# Patient Record
Sex: FEMALE | Race: OTHER | Employment: UNEMPLOYED | URBAN - METROPOLITAN AREA
[De-identification: names, ages, dates, MRNs, and addresses within clinical notes are randomized per-mention and may not be internally consistent; named-entity substitution may affect disease eponyms.]

---

## 2019-03-18 ENCOUNTER — HOSPITAL ENCOUNTER (OUTPATIENT)
Age: 24
Discharge: HOME | End: 2019-03-18
Attending: OBSTETRICS & GYNECOLOGY | Admitting: OBSTETRICS & GYNECOLOGY
Payer: SELF-PAY

## 2019-03-18 VITALS
RESPIRATION RATE: 16 BRPM | OXYGEN SATURATION: 98 % | BODY MASS INDEX: 23.04 KG/M2 | HEIGHT: 63 IN | DIASTOLIC BLOOD PRESSURE: 73 MMHG | WEIGHT: 130 LBS | SYSTOLIC BLOOD PRESSURE: 110 MMHG | TEMPERATURE: 98.3 F | HEART RATE: 96 BPM

## 2019-03-18 PROBLEM — O09.33 NO PRENATAL CARE IN CURRENT PREGNANCY IN THIRD TRIMESTER: Status: ACTIVE | Noted: 2019-03-18

## 2019-03-18 PROBLEM — R10.2 PELVIC PAIN IN ANTEPARTUM PERIOD IN THIRD TRIMESTER: Status: ACTIVE | Noted: 2019-03-18

## 2019-03-18 PROBLEM — O26.893 PELVIC PAIN IN ANTEPARTUM PERIOD IN THIRD TRIMESTER: Status: ACTIVE | Noted: 2019-03-18

## 2019-03-18 PROBLEM — O34.219 PREVIOUS CESAREAN DELIVERY AFFECTING PREGNANCY, ANTEPARTUM: Status: ACTIVE | Noted: 2019-03-18

## 2019-03-18 PROBLEM — O46.90 VAGINAL BLEEDING DURING PREGNANCY, ANTEPARTUM: Status: ACTIVE | Noted: 2019-03-18

## 2019-03-18 PROCEDURE — 75810000275 HC EMERGENCY DEPT VISIT NO LEVEL OF CARE: Performed by: OBSTETRICS & GYNECOLOGY

## 2019-03-18 PROCEDURE — 76815 OB US LIMITED FETUS(S): CPT

## 2019-03-18 PROCEDURE — 87186 SC STD MICRODIL/AGAR DIL: CPT

## 2019-03-18 PROCEDURE — 87088 URINE BACTERIA CULTURE: CPT

## 2019-03-18 PROCEDURE — 87086 URINE CULTURE/COLONY COUNT: CPT

## 2019-03-18 PROCEDURE — 99285 EMERGENCY DEPT VISIT HI MDM: CPT

## 2019-03-18 PROCEDURE — 59025 FETAL NON-STRESS TEST: CPT

## 2019-03-18 RX ORDER — CEPHALEXIN 500 MG/1
500 CAPSULE ORAL 3 TIMES DAILY
Qty: 21 CAP | Refills: 0 | OUTPATIENT
Start: 2019-03-18 | End: 2019-03-25

## 2019-03-18 NOTE — PROGRESS NOTES
Pt to [facility] with c/o contractions. . No PNC with this pregnancy. Last baby was born in [place] who is 1years old via primary C/S. No problems with previous pregnancy. Pt wishes for a  and non pharmacological with this pregnancy. Thinks she is about 42 weeks pregnant according to LMP of May 19, 2018. Dr. Robert Carpenter at bedside.

## 2019-03-18 NOTE — DISCHARGE INSTRUCTIONS
Patient Education        Precauciones en Cathy Loveless: Instrucciones de cuidado - [ Pregnancy Precautions: Care Instructions ]  Instrucciones de cuidado    No hay nayan manera price de prevenir el trabajo de parto antes de la fecha esperada (trabajo de parto prematuro) o de prevenir la mayoría de otros problemas en el [facility]. Sylvester hay cosas que puede hacer para aumentar las probabilidades de tener un embarazo saludable. Vaya a kaushik citas, siga los consejos de colon médico y cuídese. Coma abelardo y luis ejercicio (si colon médico lo permite). Y asegúrese de charmaine abundante agua. La atención de seguimiento es anyan parte clave de colon tratamiento y seguridad. Asegúrese de hacer y acudir a todas las citas, y llame a colon médico si está teniendo problemas. También es nayan buena idea saber los resultados de kaushik exámenes y mantener nayan lista de los medicamentos que courtney. ¿Cómo puede cuidarse en el hogar? · Asegúrese de asistir a las citas prenatales. Colon médico le tomará la presión arterial en cada consulta. Colon médico también comprobará si tiene proteínas en colon orina. Tanto la presión arterial narda devin la presencia de proteínas en la orina son señales de preeclampsia. Esta afección puede ser peligrosa tanto para usted devin para colon bebé. · Kate abundantes líquidos, suficientes para que colon orina sea de color amarillo horace o transparente devin el agua. La deshidratación puede causar contracciones. Si tiene [facility] [employer], el corazón o el hígado y tiene que [employer] líquidos, hable con colon médico antes de aumentar colon consumo. · Notifique a colon médico de inmediato si presenta cualquier síntoma de infección, tales devin:  ? Ardor cuando orina. ? Flujo con mal olor de la vagina. ? Comezón en la vagina. ? Theone Mungo sin explicación. ? Dolor o sensibilidad inusual en el útero o la parte baja del abdomen. · Aliméntese en forma equilibrada. Incluya muchos alimentos que ayaz ricos en calcio y adrián. ?  Entre los alimentos ricos en calcio se incluyen la [place], el [place]-barre, el yogur, Rufina Mandril y el brócoli. ? Entre los alimentos ricos en adrián se incluyen las tosha bush, los [place], las aves, los [place], los frijoles, las uvas pasas, el pan de grano integral y las verduras de hojas verdes. · No fume. Si necesita ayuda para dejar de fumar, hable con colon médico sobre programas y medicamentos para dejar de fumar. Estos pueden aumentar kaushik probabilidades de dejar el hábito para siempre. · No kaushal alcohol ni use drogas ilegales. · Siga las instrucciones de colon médico acerca de la [place]. Colon médico le dirá cuánto ejercicio puede hacer. · Pregúntele a colon médico si puede tener [employer]. Si usted está en riesgo de tener trabajo de [place], colon médico podría pedirle que no tenga relaciones sexuales. · [place] precauciones para prevenir las caídas. Kelli el embarazo las articulaciones están más sueltas y se tiene menos equilibrio. Los deportes tales devin el ciclismo, el esquí o el patinaje en línea pueden aumentar el riesgo de caídas. Y no monte a jeannie, melissa en motocicleta, luis clavados, luis esquí acuático, bucee, ni salte en paracaídas mientras está embarazada. · Evite calentarse demasiado. No use saunas ni bañeras de hidromasaje. Evite la exposición al sol en climas calientes por mucho tiempo. [place] acetaminofén (Tylenol) para bajar nayan fiebre narda. · No tome medicamentos de venta mary, productos herbarios ni suplementos sin hablar araseli con colon médico o farmacéutico.  ¿Cuándo debe pedir ayuda? Llame al 911 en cualquier momento que considere que necesita atención de [place]. Por ejemplo, llame si:    · Se desmayó (perdió el conocimiento).     · Tiene sangrado vaginal intenso.     · Tiene dolor intenso en el vientre o la pelvis.     · Le sale abundante líquido o gotea de la vagina y sabe o jessica que el cordón umbilical se está saliendo a colon vagina.  Si esto sucede, arrodíllese de inmediato, de edith forma que kaushik nalgas estén más altas que colon josephine. [place] disminuirá la presión sobre el cordón umbilical hasta que llegue la ayuda.    Llame a colon médico ahora mismo o busque atención médica inmediata si:    · Tiene señales de preeclampsia, tales devin:  ? Se le hinchan de manera repentina la chaz, las danilo o los pies. ? Problemas nuevos con la visión (devin oscurecimiento de la visión o visión borrosa). ? Dolor de josephine intenso.     · Tiene cualquier sangrado vaginal.     · Tiene dolor abdominal o cólicos.     · Tiene fiebre.     · Yahaira Welch tenido contracciones regulares (con o sin dolor) por Traci Croft. [place] significa que tiene 8 o más contracciones en 1 hora o que tiene 4 contracciones o más en 20 minutos después de [place] de posición y charmaine líquidos.     · Tiene nayan pérdida repentina de líquido por la vagina.     · Tiene dolor en la parte baja de la espalda o presión en la pelvis que no desaparece.     · Nota que colon bebé ha dejado de moverse o se mueve mucho menos de lo normal.    Preste especial atención a los cambios en colon chaim y asegúrese de comunicarse con colon médico si tiene algún problema. ¿Dónde puede encontrar más información en inglés? Willian Marin a [email or web address]-geena.info/. Wayne Cool X315 en la búsqueda para aprender más acerca de \"Precauciones en el embarazo: Instrucciones de cuidado - [ Pregnancy Precautions: Care Instructions ]. \"  Revisado: 5 septiembre, 2018  Versión del contenido: 11.9  © 3429-2488 [employer]. Las instrucciones de cuidado fueron adaptadas bajo licencia por [employer] (which disclaims liability or warranty for this information). Si usted tiene [place] Husser afección médica o sobre estas instrucciones, siempre pregunte a colon profesional de chaim. [facility] niega toda garantía o responsabilidad por colon uso de esta información.

## 2019-03-18 NOTE — ED TRIAGE NOTES
Pt c/o having contractions that are approx 30 min apart. No prenatal care. Pt estimates she is 41-42 weeks pregnant. Pt spouse does all the talking for patient, states she speaks english some but is shy. Pt will need .

## 2019-03-18 NOTE — PROGRESS NOTES
03/18/19 1829   Fetal Vital Signs   Mode External   Fetal Heart Rate 125   Fetal Activity Present   Variability 6-25 BPM   Decelerations None   Accelerations Yes   RN Reviewed Strip?  Yes   Provider who reviewed strip? liza   Non Stress Test Reactive   Uterine Activity   Mode External   Frequency (min) x1   Duration (sec) 60

## 2019-03-18 NOTE — ED PROVIDER NOTES
Chief Complaint: no prenatal care and vaginal spotting      24 y.o. female at approx 35 weeks by brief ultrasound done in triage today  who is seen for vaginal spotting and occaisonal contractions. Pt has a history of irreg menses- by lmp alone , she is about   44 weeks gestation; however, this is an unlikely EGA based on fundal height and ultrasound today and hx of irreg menses. Pt has had a previous uncomplicated pregnancy with  for failure to progress. She would like a TOLAC with this pregnancy. She has not had prenatal care because of financial concerns. She has had no complaints with this pregnancy until today when she had a little spotting. She is having rare contractions. Good fetal movement. Denies any hx of abuse or of drugs. Here with  and son who appear well dressed and very supportive. Pt is Angolan speaking. Interview conducted with . 's primary language is [place]. HISTORY:    Social History     Substance and Sexual Activity   Sexual Activity Not on file     Patient's last menstrual period was 2018.     Social History     Socioeconomic History    Marital status:      Spouse name: Not on file    Number of children: Not on file    Years of education: Not on file    Highest education level: Not on file   Social Needs    Financial resource strain: Not on file    Food insecurity - worry: Not on file    Food insecurity - inability: Not on file    Transportation needs - medical: Not on file   Omnisens needs - non-medical: Not on file   Occupational History    Not on file   Tobacco Use    Smoking status: Never Smoker    Smokeless tobacco: Never Used   Substance and Sexual Activity    Alcohol use: No     Frequency: Never    Drug use: No    Sexual activity: Not on file   Other Topics Concern     Service Not Asked    Blood Transfusions Not Asked    Caffeine Concern Not Asked    Occupational Exposure Not Asked   Prosper Malik Not Asked    Sleep Concern Not Asked    Stress Concern Not Asked    Weight Concern Not Asked    Special Diet Not Asked    Back Care Not Asked    Exercise Not Asked    Bike Helmet Not Asked    [address] Not Asked    Self-Exams Not Asked   Social History Narrative    Not on file       Past Surgical History:   Procedure Laterality Date    HX  SECTION         History reviewed. No pertinent past medical history. ROS:  A 12 point review of symptoms negative except for chief complaint as described above. PHYSICAL EXAM:  Blood pressure 110/73, pulse 96, temperature 98.3 °F (36.8 °C), resp. rate 16, height 5' 3\" (1.6 m), weight 59 kg (130 lb), last menstrual period 2018, SpO2 98 %. Constitutional: The patient appears well, alert, oriented x 3. Cardiovascular: Heart RRR, no murmurs.    Respiratory: Lungs clear, no respiratory distress  GI: Abdomen soft, nontender, no guarding  Fundal height 37 cm  Well healed csection scar without tenderness  No fundal tenderness  Musculoskeletal: no cva tenderness  Upper ext: no edema, reflexes +2  Lower ext: no edema, neg samara's, reflexes +2  Skin: no rashes or lesions  Psychiatric:Mood/ Affect: appropriate  Genitourinary: SVE:cl/th- no blood on exam  FHT:reactive, cat 1  TOCO:no contractions  Psych- appropriate, well dressed, happy  ua- + nitrites  Bedside ultrasound- vertex, ant placenta grade 2, yuriy = 8.2, approx gest age around 28 weeks        I personally reviewed pt's medical record including relevant labs and ultrasounds    Assessment/Plan:  23 yo  at approx 35 weeks with no prenatal care; hx previous csection, desires tolac  - will have social work call in am to get pt established with ob provider  - reactive nst today  No evidence of labor  + uti, rx keflex, culture sent

## 2019-03-19 NOTE — PROGRESS NOTES
present for all encounters with [facility] RN and Dr. Xuan Ang in [facility]. Thank you,      Yimi Pimentel, [id number] [facility] [address] /  [email or web address] c: [phone] / [address]  [facility] / [place], [address] [facility]  [email or web address]

## 2019-03-21 LAB
BACTERIA SPEC CULT: ABNORMAL
SERVICE CMNT-IMP: ABNORMAL